# Patient Record
Sex: FEMALE | Race: WHITE | NOT HISPANIC OR LATINO
[De-identification: names, ages, dates, MRNs, and addresses within clinical notes are randomized per-mention and may not be internally consistent; named-entity substitution may affect disease eponyms.]

---

## 2017-03-06 ENCOUNTER — APPOINTMENT (OUTPATIENT)
Dept: ULTRASOUND IMAGING | Facility: IMAGING CENTER | Age: 67
End: 2017-03-06

## 2017-03-06 ENCOUNTER — APPOINTMENT (OUTPATIENT)
Dept: MAMMOGRAPHY | Facility: IMAGING CENTER | Age: 67
End: 2017-03-06

## 2017-03-06 ENCOUNTER — OUTPATIENT (OUTPATIENT)
Dept: OUTPATIENT SERVICES | Facility: HOSPITAL | Age: 67
LOS: 1 days | End: 2017-03-06
Payer: MEDICARE

## 2017-03-06 DIAGNOSIS — Z00.8 ENCOUNTER FOR OTHER GENERAL EXAMINATION: ICD-10-CM

## 2017-03-06 PROCEDURE — 76641 ULTRASOUND BREAST COMPLETE: CPT

## 2017-03-06 PROCEDURE — 77063 BREAST TOMOSYNTHESIS BI: CPT

## 2017-03-06 PROCEDURE — 77067 SCR MAMMO BI INCL CAD: CPT

## 2017-03-08 ENCOUNTER — OUTPATIENT (OUTPATIENT)
Dept: OUTPATIENT SERVICES | Facility: HOSPITAL | Age: 67
LOS: 1 days | End: 2017-03-08
Payer: MEDICARE

## 2017-03-08 ENCOUNTER — APPOINTMENT (OUTPATIENT)
Dept: ULTRASOUND IMAGING | Facility: IMAGING CENTER | Age: 67
End: 2017-03-08

## 2017-03-08 DIAGNOSIS — Z00.8 ENCOUNTER FOR OTHER GENERAL EXAMINATION: ICD-10-CM

## 2017-03-08 PROCEDURE — 76642 ULTRASOUND BREAST LIMITED: CPT

## 2017-03-09 ENCOUNTER — APPOINTMENT (OUTPATIENT)
Dept: SURGICAL ONCOLOGY | Facility: CLINIC | Age: 67
End: 2017-03-09

## 2017-03-09 VITALS
HEART RATE: 88 BPM | WEIGHT: 125 LBS | TEMPERATURE: 97.6 F | HEIGHT: 64 IN | BODY MASS INDEX: 21.34 KG/M2 | DIASTOLIC BLOOD PRESSURE: 83 MMHG | OXYGEN SATURATION: 92 % | RESPIRATION RATE: 16 BRPM | SYSTOLIC BLOOD PRESSURE: 146 MMHG

## 2017-05-24 ENCOUNTER — RESULT REVIEW (OUTPATIENT)
Age: 67
End: 2017-05-24

## 2018-03-09 ENCOUNTER — APPOINTMENT (OUTPATIENT)
Dept: MAMMOGRAPHY | Facility: IMAGING CENTER | Age: 68
End: 2018-03-09
Payer: MEDICARE

## 2018-03-09 ENCOUNTER — OUTPATIENT (OUTPATIENT)
Dept: OUTPATIENT SERVICES | Facility: HOSPITAL | Age: 68
LOS: 1 days | End: 2018-03-09
Payer: MEDICARE

## 2018-03-09 ENCOUNTER — APPOINTMENT (OUTPATIENT)
Dept: ULTRASOUND IMAGING | Facility: IMAGING CENTER | Age: 68
End: 2018-03-09
Payer: MEDICARE

## 2018-03-09 DIAGNOSIS — N60.19 DIFFUSE CYSTIC MASTOPATHY OF UNSPECIFIED BREAST: ICD-10-CM

## 2018-03-09 PROCEDURE — 76641 ULTRASOUND BREAST COMPLETE: CPT

## 2018-03-09 PROCEDURE — 76641 ULTRASOUND BREAST COMPLETE: CPT | Mod: 26,50

## 2018-03-09 PROCEDURE — 77063 BREAST TOMOSYNTHESIS BI: CPT | Mod: 26

## 2018-03-09 PROCEDURE — 77067 SCR MAMMO BI INCL CAD: CPT

## 2018-03-09 PROCEDURE — 77067 SCR MAMMO BI INCL CAD: CPT | Mod: 26

## 2018-03-09 PROCEDURE — 77063 BREAST TOMOSYNTHESIS BI: CPT

## 2018-04-16 ENCOUNTER — APPOINTMENT (OUTPATIENT)
Dept: SURGICAL ONCOLOGY | Facility: CLINIC | Age: 68
End: 2018-04-16

## 2019-03-10 ENCOUNTER — FORM ENCOUNTER (OUTPATIENT)
Age: 69
End: 2019-03-10

## 2019-03-11 ENCOUNTER — APPOINTMENT (OUTPATIENT)
Dept: MAMMOGRAPHY | Facility: IMAGING CENTER | Age: 69
End: 2019-03-11
Payer: MEDICARE

## 2019-03-11 ENCOUNTER — OUTPATIENT (OUTPATIENT)
Dept: OUTPATIENT SERVICES | Facility: HOSPITAL | Age: 69
LOS: 1 days | End: 2019-03-11
Payer: MEDICARE

## 2019-03-11 ENCOUNTER — APPOINTMENT (OUTPATIENT)
Dept: ULTRASOUND IMAGING | Facility: IMAGING CENTER | Age: 69
End: 2019-03-11
Payer: MEDICARE

## 2019-03-11 DIAGNOSIS — N60.19 DIFFUSE CYSTIC MASTOPATHY OF UNSPECIFIED BREAST: ICD-10-CM

## 2019-03-11 PROCEDURE — 77067 SCR MAMMO BI INCL CAD: CPT

## 2019-03-11 PROCEDURE — 77063 BREAST TOMOSYNTHESIS BI: CPT | Mod: 26

## 2019-03-11 PROCEDURE — 76641 ULTRASOUND BREAST COMPLETE: CPT

## 2019-03-11 PROCEDURE — 76641 ULTRASOUND BREAST COMPLETE: CPT | Mod: 26,50

## 2019-03-11 PROCEDURE — 77063 BREAST TOMOSYNTHESIS BI: CPT

## 2019-03-11 PROCEDURE — 77067 SCR MAMMO BI INCL CAD: CPT | Mod: 26

## 2019-03-14 ENCOUNTER — APPOINTMENT (OUTPATIENT)
Dept: SURGICAL ONCOLOGY | Facility: CLINIC | Age: 69
End: 2019-03-14
Payer: MEDICARE

## 2019-03-14 VITALS
SYSTOLIC BLOOD PRESSURE: 137 MMHG | HEIGHT: 64 IN | DIASTOLIC BLOOD PRESSURE: 85 MMHG | BODY MASS INDEX: 23.14 KG/M2 | HEART RATE: 70 BPM | TEMPERATURE: 98.1 F | OXYGEN SATURATION: 97 % | WEIGHT: 135.56 LBS

## 2019-03-14 DIAGNOSIS — N60.19 DIFFUSE CYSTIC MASTOPATHY OF UNSPECIFIED BREAST: ICD-10-CM

## 2019-03-14 PROCEDURE — 99214 OFFICE O/P EST MOD 30 MIN: CPT

## 2019-03-14 NOTE — PHYSICAL EXAM
[Normal] : normal breast inspection and palpation of axillas [Normal Supraclavicular Lymph Nodes] : normal supraclavicular lymph nodes [Normal Axillary Lymph Nodes] : normal axillary lymph nodes [Normal] : oriented to person, place and time, with appropriate affect [FreeTextEntry1] : AB present during exam  [de-identified] : No dominant mass or nipple discharge bilaterally.

## 2019-03-14 NOTE — HISTORY OF PRESENT ILLNESS
[de-identified] : Allison is a 68 year old post menopausal female here for an annual breast exam.  She has not been seen since March 2017.  Her history is significant for benign left breast biopsy in 2009 and history of benign cyst aspiration.  She denies personal or family history of breast or ovarian cancer.  Her brother succumbed to pancreatic testing.  Given her brothers history, she underwent a comprehensive genetic testing panel with a physician at [facility] and states she did not harbor any mutations. \par \par Most recent imaging consists of a mammogram and sonogram performed in March 2019 which demonstrated benign findings (BIRADS 2).  \par \par Today she is without any complaints. Denies palpable masses, nipple discharge, skin changes, inversion or breast pain.

## 2019-03-14 NOTE — CONSULT LETTER
[Dear  ___] : Dear  [unfilled], [Consult Letter:] : I had the pleasure of evaluating your patient, [unfilled]. [Please see my note below.] : Please see my note below. [Consult Closing:] : Thank you very much for allowing me to participate in the care of this patient.  If you have any questions, please do not hesitate to contact me. [Sincerely,] : Sincerely, [DrVenancio  ___] : Dr. PROCTOR [FreeTextEntry2] : Scott Pedro MD [FreeTextEntry1] : Allison is a 68 year old post menopausal female here for an annual breast exam.  She has not been seen since March 2017.  Her history is significant for benign left breast biopsy in 2009 and history of benign cyst aspiration.  She denies personal or family history of breast or ovarian cancer.  Her brother succumbed to pancreatic testing.  Given her brothers history, she underwent a comprehensive genetic testing panel with a physician at [facility] and states she did not harbor any mutations. \par \par Most recent imaging consists of a mammogram and sonogram performed in March 2019 which demonstrated benign findings (BIRADS 2).  \par \par Today she is without any complaints. Denies palpable masses, nipple discharge, skin changes, inversion or breast pain. \par \par On exam, both breasts are without any dominant masses. There is no axillary adenopathy on either side.\par \par I have asked Allison to follow up in a year upon completion of annual bilateral mammogram and sonogram. [FreeTextEntry3] : Samuel Henry MD\par Surgical Oncology\par [facility]\par Office: [phone]\par Cell: [phone]

## 2020-03-13 ENCOUNTER — APPOINTMENT (OUTPATIENT)
Dept: MAMMOGRAPHY | Facility: IMAGING CENTER | Age: 70
End: 2020-03-13

## 2020-03-13 ENCOUNTER — APPOINTMENT (OUTPATIENT)
Dept: ULTRASOUND IMAGING | Facility: IMAGING CENTER | Age: 70
End: 2020-03-13

## 2020-03-26 ENCOUNTER — APPOINTMENT (OUTPATIENT)
Dept: SURGICAL ONCOLOGY | Facility: CLINIC | Age: 70
End: 2020-03-26

## 2020-07-21 ENCOUNTER — OUTPATIENT (OUTPATIENT)
Dept: OUTPATIENT SERVICES | Facility: HOSPITAL | Age: 70
LOS: 1 days | End: 2020-07-21
Payer: MEDICARE

## 2020-07-21 ENCOUNTER — APPOINTMENT (OUTPATIENT)
Dept: MAMMOGRAPHY | Facility: IMAGING CENTER | Age: 70
End: 2020-07-21
Payer: MEDICARE

## 2020-07-21 ENCOUNTER — APPOINTMENT (OUTPATIENT)
Dept: ULTRASOUND IMAGING | Facility: IMAGING CENTER | Age: 70
End: 2020-07-21
Payer: MEDICARE

## 2020-07-21 DIAGNOSIS — Z00.8 ENCOUNTER FOR OTHER GENERAL EXAMINATION: ICD-10-CM

## 2020-07-21 PROCEDURE — 76641 ULTRASOUND BREAST COMPLETE: CPT

## 2020-07-21 PROCEDURE — 77063 BREAST TOMOSYNTHESIS BI: CPT | Mod: 26

## 2020-07-21 PROCEDURE — 77067 SCR MAMMO BI INCL CAD: CPT | Mod: 26

## 2020-07-21 PROCEDURE — 77063 BREAST TOMOSYNTHESIS BI: CPT

## 2020-07-21 PROCEDURE — 76641 ULTRASOUND BREAST COMPLETE: CPT | Mod: 26,50

## 2020-07-21 PROCEDURE — 77067 SCR MAMMO BI INCL CAD: CPT

## 2021-07-22 ENCOUNTER — APPOINTMENT (OUTPATIENT)
Dept: ULTRASOUND IMAGING | Facility: IMAGING CENTER | Age: 71
End: 2021-07-22
Payer: MEDICARE

## 2021-07-22 ENCOUNTER — OUTPATIENT (OUTPATIENT)
Dept: OUTPATIENT SERVICES | Facility: HOSPITAL | Age: 71
LOS: 1 days | End: 2021-07-22
Payer: MEDICARE

## 2021-07-22 ENCOUNTER — APPOINTMENT (OUTPATIENT)
Dept: MAMMOGRAPHY | Facility: IMAGING CENTER | Age: 71
End: 2021-07-22
Payer: MEDICARE

## 2021-07-22 DIAGNOSIS — Z00.8 ENCOUNTER FOR OTHER GENERAL EXAMINATION: ICD-10-CM

## 2021-07-22 PROCEDURE — 76641 ULTRASOUND BREAST COMPLETE: CPT | Mod: 26,50

## 2021-07-22 PROCEDURE — 77063 BREAST TOMOSYNTHESIS BI: CPT | Mod: 26

## 2021-07-22 PROCEDURE — 77063 BREAST TOMOSYNTHESIS BI: CPT

## 2021-07-22 PROCEDURE — 77067 SCR MAMMO BI INCL CAD: CPT | Mod: 26

## 2021-07-22 PROCEDURE — 76641 ULTRASOUND BREAST COMPLETE: CPT

## 2021-07-22 PROCEDURE — 77067 SCR MAMMO BI INCL CAD: CPT

## 2022-03-03 ENCOUNTER — OUTPATIENT (OUTPATIENT)
Dept: OUTPATIENT SERVICES | Facility: HOSPITAL | Age: 72
LOS: 1 days | End: 2022-03-03
Payer: MEDICARE

## 2022-03-03 ENCOUNTER — APPOINTMENT (OUTPATIENT)
Dept: ULTRASOUND IMAGING | Facility: IMAGING CENTER | Age: 72
End: 2022-03-03
Payer: MEDICARE

## 2022-03-03 ENCOUNTER — APPOINTMENT (OUTPATIENT)
Dept: MAMMOGRAPHY | Facility: IMAGING CENTER | Age: 72
End: 2022-03-03
Payer: MEDICARE

## 2022-03-03 DIAGNOSIS — N60.12 DIFFUSE CYSTIC MASTOPATHY OF LEFT BREAST: ICD-10-CM

## 2022-03-03 DIAGNOSIS — N60.11 DIFFUSE CYSTIC MASTOPATHY OF RIGHT BREAST: ICD-10-CM

## 2022-03-03 PROCEDURE — 76641 ULTRASOUND BREAST COMPLETE: CPT

## 2022-03-03 PROCEDURE — 76642 ULTRASOUND BREAST LIMITED: CPT | Mod: 26,RT

## 2022-03-03 PROCEDURE — 76642 ULTRASOUND BREAST LIMITED: CPT

## 2022-05-06 ENCOUNTER — EMERGENCY (EMERGENCY)
Facility: HOSPITAL | Age: 72
LOS: 1 days | Discharge: HOME | End: 2022-05-06
Attending: EMERGENCY MEDICINE
Payer: MEDICARE

## 2022-05-06 ENCOUNTER — APPOINTMENT (OUTPATIENT)
Dept: UROLOGY | Facility: CLINIC | Age: 72
End: 2022-05-06
Payer: MEDICARE

## 2022-05-06 VITALS
SYSTOLIC BLOOD PRESSURE: 136 MMHG | HEIGHT: 59 IN | RESPIRATION RATE: 18 BRPM | OXYGEN SATURATION: 97 % | DIASTOLIC BLOOD PRESSURE: 73 MMHG | HEART RATE: 73 BPM | WEIGHT: 125 LBS | TEMPERATURE: 98 F

## 2022-05-06 VITALS
WEIGHT: 125 LBS | RESPIRATION RATE: 14 BRPM | BODY MASS INDEX: 25.2 KG/M2 | HEIGHT: 59 IN | HEART RATE: 76 BPM | OXYGEN SATURATION: 96 % | DIASTOLIC BLOOD PRESSURE: 78 MMHG | TEMPERATURE: 97.8 F | SYSTOLIC BLOOD PRESSURE: 120 MMHG

## 2022-05-06 DIAGNOSIS — N20.0 CALCULUS OF KIDNEY: ICD-10-CM

## 2022-05-06 LAB
ALBUMIN SERPL ELPH-MCNC: 4.6 G/DL — SIGNIFICANT CHANGE UP (ref 3.3–5)
ALP SERPL-CCNC: 76 U/L — SIGNIFICANT CHANGE UP (ref 40–120)
ALT FLD-CCNC: 18 U/L — SIGNIFICANT CHANGE UP (ref 10–45)
ANION GAP SERPL CALC-SCNC: 15 MMOL/L — SIGNIFICANT CHANGE UP (ref 5–17)
APPEARANCE UR: CLEAR — SIGNIFICANT CHANGE UP
AST SERPL-CCNC: 24 U/L — SIGNIFICANT CHANGE UP (ref 10–40)
BACTERIA # UR AUTO: NEGATIVE — SIGNIFICANT CHANGE UP
BASOPHILS # BLD AUTO: 0.03 K/UL — SIGNIFICANT CHANGE UP (ref 0–0.2)
BASOPHILS NFR BLD AUTO: 0.3 % — SIGNIFICANT CHANGE UP (ref 0–2)
BILIRUB SERPL-MCNC: 0.9 MG/DL — SIGNIFICANT CHANGE UP (ref 0.2–1.2)
BILIRUB UR-MCNC: NEGATIVE — SIGNIFICANT CHANGE UP
BUN SERPL-MCNC: 13 MG/DL — SIGNIFICANT CHANGE UP (ref 7–23)
CALCIUM SERPL-MCNC: 10.3 MG/DL — SIGNIFICANT CHANGE UP (ref 8.4–10.5)
CHLORIDE SERPL-SCNC: 99 MMOL/L — SIGNIFICANT CHANGE UP (ref 96–108)
CO2 SERPL-SCNC: 22 MMOL/L — SIGNIFICANT CHANGE UP (ref 22–31)
COLOR SPEC: COLORLESS — SIGNIFICANT CHANGE UP
CREAT SERPL-MCNC: 0.78 MG/DL — SIGNIFICANT CHANGE UP (ref 0.5–1.3)
DIFF PNL FLD: NEGATIVE — SIGNIFICANT CHANGE UP
EGFR: 81 ML/MIN/1.73M2 — SIGNIFICANT CHANGE UP
EOSINOPHIL # BLD AUTO: 0.03 K/UL — SIGNIFICANT CHANGE UP (ref 0–0.5)
EOSINOPHIL NFR BLD AUTO: 0.3 % — SIGNIFICANT CHANGE UP (ref 0–6)
EPI CELLS # UR: 0 /HPF — SIGNIFICANT CHANGE UP
FLUAV AG NPH QL: SIGNIFICANT CHANGE UP
FLUBV AG NPH QL: SIGNIFICANT CHANGE UP
GLUCOSE SERPL-MCNC: 114 MG/DL — HIGH (ref 70–99)
GLUCOSE UR QL: NEGATIVE — SIGNIFICANT CHANGE UP
HCT VFR BLD CALC: 47.3 % — HIGH (ref 34.5–45)
HGB BLD-MCNC: 15.6 G/DL — HIGH (ref 11.5–15.5)
IMM GRANULOCYTES NFR BLD AUTO: 0.7 % — SIGNIFICANT CHANGE UP (ref 0–1.5)
KETONES UR-MCNC: NEGATIVE — SIGNIFICANT CHANGE UP
LEUKOCYTE ESTERASE UR-ACNC: NEGATIVE — SIGNIFICANT CHANGE UP
LYMPHOCYTES # BLD AUTO: 1.19 K/UL — SIGNIFICANT CHANGE UP (ref 1–3.3)
LYMPHOCYTES # BLD AUTO: 10.5 % — LOW (ref 13–44)
MAGNESIUM SERPL-MCNC: 2 MG/DL — SIGNIFICANT CHANGE UP (ref 1.6–2.6)
MCHC RBC-ENTMCNC: 29.5 PG — SIGNIFICANT CHANGE UP (ref 27–34)
MCHC RBC-ENTMCNC: 33 GM/DL — SIGNIFICANT CHANGE UP (ref 32–36)
MCV RBC AUTO: 89.4 FL — SIGNIFICANT CHANGE UP (ref 80–100)
MONOCYTES # BLD AUTO: 1.1 K/UL — HIGH (ref 0–0.9)
MONOCYTES NFR BLD AUTO: 9.7 % — SIGNIFICANT CHANGE UP (ref 2–14)
NEUTROPHILS # BLD AUTO: 8.88 K/UL — HIGH (ref 1.8–7.4)
NEUTROPHILS NFR BLD AUTO: 78.5 % — HIGH (ref 43–77)
NITRITE UR-MCNC: NEGATIVE — SIGNIFICANT CHANGE UP
NRBC # BLD: 0 /100 WBCS — SIGNIFICANT CHANGE UP (ref 0–0)
PH UR: 6.5 — SIGNIFICANT CHANGE UP (ref 5–8)
PHOSPHATE SERPL-MCNC: 3.4 MG/DL — SIGNIFICANT CHANGE UP (ref 2.5–4.5)
PLATELET # BLD AUTO: 235 K/UL — SIGNIFICANT CHANGE UP (ref 150–400)
POTASSIUM SERPL-MCNC: 3.6 MMOL/L — SIGNIFICANT CHANGE UP (ref 3.5–5.3)
POTASSIUM SERPL-SCNC: 3.6 MMOL/L — SIGNIFICANT CHANGE UP (ref 3.5–5.3)
PROT SERPL-MCNC: 7.5 G/DL — SIGNIFICANT CHANGE UP (ref 6–8.3)
PROT UR-MCNC: NEGATIVE — SIGNIFICANT CHANGE UP
RBC # BLD: 5.29 M/UL — HIGH (ref 3.8–5.2)
RBC # FLD: 13.7 % — SIGNIFICANT CHANGE UP (ref 10.3–14.5)
RBC CASTS # UR COMP ASSIST: 0 /HPF — SIGNIFICANT CHANGE UP (ref 0–4)
RSV RNA NPH QL NAA+NON-PROBE: SIGNIFICANT CHANGE UP
SARS-COV-2 RNA SPEC QL NAA+PROBE: SIGNIFICANT CHANGE UP
SODIUM SERPL-SCNC: 136 MMOL/L — SIGNIFICANT CHANGE UP (ref 135–145)
SP GR SPEC: 1 — LOW (ref 1.01–1.02)
TROPONIN T, HIGH SENSITIVITY RESULT: <6 NG/L — SIGNIFICANT CHANGE UP (ref 0–51)
UROBILINOGEN FLD QL: NEGATIVE — SIGNIFICANT CHANGE UP
WBC # BLD: 11.31 K/UL — HIGH (ref 3.8–10.5)
WBC # FLD AUTO: 11.31 K/UL — HIGH (ref 3.8–10.5)
WBC UR QL: 1 /HPF — SIGNIFICANT CHANGE UP (ref 0–5)

## 2022-05-06 PROCEDURE — 74176 CT ABD & PELVIS W/O CONTRAST: CPT | Mod: 26,MA

## 2022-05-06 PROCEDURE — 93010 ELECTROCARDIOGRAM REPORT: CPT | Mod: GC

## 2022-05-06 PROCEDURE — 74176 CT ABD & PELVIS W/O CONTRAST: CPT | Mod: MA

## 2022-05-06 PROCEDURE — 80053 COMPREHEN METABOLIC PANEL: CPT

## 2022-05-06 PROCEDURE — 99204 OFFICE O/P NEW MOD 45 MIN: CPT

## 2022-05-06 PROCEDURE — 85025 COMPLETE CBC W/AUTO DIFF WBC: CPT

## 2022-05-06 PROCEDURE — 83735 ASSAY OF MAGNESIUM: CPT

## 2022-05-06 PROCEDURE — 81001 URINALYSIS AUTO W/SCOPE: CPT

## 2022-05-06 PROCEDURE — 87086 URINE CULTURE/COLONY COUNT: CPT

## 2022-05-06 PROCEDURE — 84484 ASSAY OF TROPONIN QUANT: CPT

## 2022-05-06 PROCEDURE — 99285 EMERGENCY DEPT VISIT HI MDM: CPT | Mod: 25,GC

## 2022-05-06 PROCEDURE — 93005 ELECTROCARDIOGRAM TRACING: CPT

## 2022-05-06 PROCEDURE — 84100 ASSAY OF PHOSPHORUS: CPT

## 2022-05-06 PROCEDURE — 87637 SARSCOV2&INF A&B&RSV AMP PRB: CPT

## 2022-05-06 PROCEDURE — 99285 EMERGENCY DEPT VISIT HI MDM: CPT | Mod: 25

## 2022-05-06 RX ORDER — KETOROLAC TROMETHAMINE 30 MG/ML
15 SYRINGE (ML) INJECTION ONCE
Refills: 0 | Status: DISCONTINUED | OUTPATIENT
Start: 2022-05-06 | End: 2022-05-06

## 2022-05-06 RX ORDER — ACETAMINOPHEN 500 MG
975 TABLET ORAL ONCE
Refills: 0 | Status: DISCONTINUED | OUTPATIENT
Start: 2022-05-06 | End: 2022-05-06

## 2022-05-06 RX ORDER — SODIUM CHLORIDE 9 MG/ML
1000 INJECTION INTRAMUSCULAR; INTRAVENOUS; SUBCUTANEOUS ONCE
Refills: 0 | Status: COMPLETED | OUTPATIENT
Start: 2022-05-06 | End: 2022-05-06

## 2022-05-06 RX ORDER — ACETAMINOPHEN 500 MG
1000 TABLET ORAL ONCE
Refills: 0 | Status: COMPLETED | OUTPATIENT
Start: 2022-05-06 | End: 2022-05-06

## 2022-05-06 RX ADMIN — SODIUM CHLORIDE 1000 MILLILITER(S): 9 INJECTION INTRAMUSCULAR; INTRAVENOUS; SUBCUTANEOUS at 22:27

## 2022-05-06 RX ADMIN — Medication 15 MILLIGRAM(S): at 22:28

## 2022-05-06 RX ADMIN — Medication 400 MILLIGRAM(S): at 22:27

## 2022-05-06 NOTE — ED PROVIDER NOTE - PROGRESS NOTE DETAILS
Lizette, PGY-3: likely msk pain causing vagal episodes, discussed case with neurologist who agrees, her recent MRI and carotid u/s were negative, CT here non-actionable no nephrolithiasis (renal stone seen), UA negative. PT feels much improved requesting discharge. strict return precautions given, f/u outpatient, ambulatory without difficulty.
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
## 2022-05-06 NOTE — ED PROVIDER NOTE - PHYSICAL EXAMINATION
[Const] well-appearing, resting comfortably, no acute distress  [HEENT] PERRL, EOMI, moist mucus membranes  [Neck] Supple, trachea midline  [CV] +S1/S2, no m/r/g appreciated  [Lungs] Clear to auscultations bilaterally, no adventitious lung sounds  [Abd] soft, non-tender, nondistended in all 4 quadrants  [MSK] 5/5 upper extremity and lower extremity str bilaterally  [Skin] warm, dry, well-perfused  [Neuro] A&Ox3, gait intact  [] rt cvat

## 2022-05-06 NOTE — ED PROVIDER NOTE - PATIENT PORTAL LINK FT
You can access the FollowMyHealth Patient Portal offered by [facility] by registering at the following website: [email or web address]. By joining [employer]’s FollowMyHealth portal, you will also be able to view your health information using other applications (apps) compatible with our system.

## 2022-05-06 NOTE — ED ADULT NURSE NOTE - NSIMPLEMENTINTERV_GEN_ALL_ED
Implemented All Universal Safety Interventions:  [place] to call system. Call bell, personal items and telephone within reach. Instruct patient to call for assistance. Room bathroom lighting operational. Non-slip footwear when patient is off stretcher. Physically safe environment: no spills, clutter or unnecessary equipment. Stretcher in lowest position, wheels locked, appropriate side rails in place. Patent

## 2022-05-06 NOTE — ED PROVIDER NOTE - ATTENDING CONTRIBUTION TO CARE
71F with PMHx/PSHx including renal calculus, s/p cholecystectomy, hemangioma,  presents to the ED with two episodes of syncope within the last 24 hrs with r flank pain radiation to the side, with no uti sts, no n/v/f.  pt had prior kidney stone with dr weber 10 yrs ago. pt has been seeing dr humeivic for syncope we spoke with him carotid doppler, mri nl likely vasovagal from pain, dehydration.

## 2022-05-06 NOTE — ED PROVIDER NOTE - RAPID ASSESSMENT
71F with PMHx/PSHx including renal calculus, s/p cholecystectomy presents to the ED with two episodes of syncope within the last 24 hours. Patient first passed out last night at 2130. Endorses LOC, but is unsure if there was head trauma. Patient has an MRI done today, Dr. Yepez (neurology) preformed the MRI. Reports another episode of syncope after performing the MRI. Endorses severe flank pain starting yesterday before the episode of syncope. Reports that the pain feels as if a "baseball bat hit her side." Denies bruising. Reports soft stool yesterday and today. Endorses nausea. Denies any episode of emesis.     Srini RICH (Scribe) have documented this rapid assessment note under the dictation of Clarissa Lopez (PA) which has been reviewed and affirmed to be accurate. Patient was seen as a QPA patient. The patient will be seen and further worked up in the main emergency department and their care will be completed by the main emergency department team along with a thorough physical exam. Receiving team will follow up on labs, analgesia, any clinical imaging, reassess and disposition as clinically indicated, all decisions regarding the progression of care will be made at their discretion. 71F with PMHx/PSHx including renal calculus, s/p cholecystectomy, hemangioma,  presents to the ED with two episodes of syncope within the last 24 hours. Patient first passed out last night at 2130. Endorses LOC, but is unsure if there was head trauma. Patient has an MRI done today, Dr. Yepez (neurology) referred for MRI for f/u of previous surgery (?). Reports another episode of syncope after performing the MRI, caught by technician. Also endorses severe R flank pain starting yesterday before the episode of syncope. Reports that the pain feels as if a "baseball bat hit her side." Denies bruising. Reports soft stool yesterday and today. Endorses nausea. Denies any episode of emesis. Denies fever/chills, urinary symptoms, CP, SOB.     Srini RICH (Scribchong) have documented this rapid assessment note under the dictation of Clarissa Lopez (PA) which has been reviewed and affirmed to be accurate. Patient was seen as a QPA patient. The patient will be seen and further worked up in the main emergency department and their care will be completed by the main emergency department team along with a thorough physical exam. Receiving team will follow up on labs, analgesia, any clinical imaging, reassess and disposition as clinically indicated, all decisions regarding the progression of care will be made at their discretion.      Clarissa RICH PA-C, personally performed the service described in the documentation recorded by the scribe in my presence, and it accurately and completely records my words and actions.

## 2022-05-06 NOTE — ED ADULT TRIAGE NOTE - CHIEF COMPLAINT QUOTE
slipped and fell last night, hit head and reportedly +LOC  went for mri brain done today normal, syncope while getting off mri table

## 2022-05-06 NOTE — ED ADULT NURSE NOTE - NSICDXPASTSURGICALHX_GEN_ALL_CORE_FT
PAST SURGICAL HISTORY:  History of cholecystectomy     History of vein stripping     Hx of tonsillectomy     Thyroid nodule s/p excision (L) side

## 2022-05-06 NOTE — ED ADULT NURSE NOTE - OBJECTIVE STATEMENT
70 y/o F A&Ox3 PMH liver hemangioma, L breast mass, renal calculus PSH cholecystectomy presents to the ED from home c/o right flank pain. Pt endorses intermittent right flank pain x2 days. 70 y/o F A&Ox3 PMH liver hemangioma, L breast mass, renal calculus PSH cholecystectomy presents to the ED from home c/o right flank pain. Pt endorses intermittent, nonradiating right flank pain x2 days. Pt reports 1 episode of vomiting yesterday. Pt also endorses two syncopal episodes over the past two days. Pt reports falling to the floor and hitting her head, pt states she had scheduled MRI preformed yesterday morning. Upon arrival to ED pt is well appearing. Breathing is even and unlabored. Skin is warm, dry & in tact. Denies CP, SOB, HA, numbness, tingling, urinary s/s, fevers, chills. IV access obtained by QRN. Comfort & safety provided.

## 2022-05-06 NOTE — ED PROVIDER NOTE - CLINICAL SUMMARY MEDICAL DECISION MAKING FREE TEXT BOX
72 y/o F w/ hx nephrolithiasis presents with right flank pain and syncope x2. Outpatient uro requested CT non-con renal (in [facility]). Obtain labs, UA, CT, pain meds and supportive care.

## 2022-05-06 NOTE — ED PROVIDER NOTE - OBJECTIVE STATEMENT
70 y/o F w/ hx nephrolithiasis, hemangioma presents with 2 episodes of sycope within the last 24 hours. Patient passed out last night at 21:30, has also been having severe right flank pain that feels like her kidney stones in the past. Had an outpatient routine MRI that was happened to be scheduled today. Discussed case with outpatient neurologist Cris who believes she is having vasovagal events, possibly associated with pain. States her MRI only showed hemangioma, no acute findings, and carotid ultrasound was negative. Patient also saw Dr. eHrnandez (urology office) who wanted her to get CT abd non-con for stone hunt. denies hematuria urgency frequency.

## 2022-05-07 VITALS
OXYGEN SATURATION: 100 % | RESPIRATION RATE: 18 BRPM | TEMPERATURE: 98 F | SYSTOLIC BLOOD PRESSURE: 146 MMHG | HEART RATE: 68 BPM | DIASTOLIC BLOOD PRESSURE: 72 MMHG

## 2022-05-07 LAB
APPEARANCE: CLEAR
BACTERIA: NEGATIVE
BILIRUBIN URINE: NEGATIVE
BLOOD URINE: NEGATIVE
COLOR: YELLOW
CULTURE RESULTS: SIGNIFICANT CHANGE UP
GLUCOSE QUALITATIVE U: NEGATIVE
HYALINE CASTS: 0 /LPF
KETONES URINE: NEGATIVE
LEUKOCYTE ESTERASE URINE: NEGATIVE
MICROSCOPIC-UA: NORMAL
NITRITE URINE: NEGATIVE
PH URINE: 6.5
PROTEIN URINE: NEGATIVE
RED BLOOD CELLS URINE: 1 /HPF
SPECIFIC GRAVITY URINE: 1.01
SPECIMEN SOURCE: SIGNIFICANT CHANGE UP
SQUAMOUS EPITHELIAL CELLS: 1 /HPF
UROBILINOGEN URINE: NORMAL
WHITE BLOOD CELLS URINE: 1 /HPF

## 2022-05-08 LAB — BACTERIA UR CULT: NORMAL

## 2022-05-09 ENCOUNTER — NON-APPOINTMENT (OUTPATIENT)
Age: 72
End: 2022-05-09

## 2022-05-09 NOTE — LETTER BODY
[Dear  ___] : Dear  [unfilled], [Consult Letter:] : I had the pleasure of evaluating your patient, [unfilled]. [Please see my note below.] : Please see my note below. [Consult Closing:] : Thank you very much for allowing me to participate in the care of this patient.  If you have any questions, please do not hesitate to contact me. [Sincerely,] : Sincerely, [FreeTextEntry2] : Dr Matthew Ortiz\par [address]\par [place], [place]\par [address] [FreeTextEntry3] : Torey Rain MD\par [facility]\par [address] [place] [address], [address]\par [place], [place] [address]\par p: [phone]\par f: [phone]

## 2022-05-09 NOTE — HISTORY OF PRESENT ILLNESS
[FreeTextEntry1] : YANCY LANDERS is a 71 year F who presents today as a new patient evaluation for flank pain.\par \par Yesterday, she had a loose BM and felt unwell. Her whole back hurt. She then developed severe right flank pain and passed out. When she woke up she was nauseated. She had chills with the pain without fever. No dysuria, gross hematuria. Feels better now. Has a history of stones and had a URS with Dr. Hernandez in 2013. She thinks this is similar.

## 2022-05-09 NOTE — PHYSICAL EXAM
[Normal Appearance] : normal appearance [Edema] : no peripheral edema [Exaggerated Use Of Accessory Muscles For Inspiration] : no accessory muscle use [Lungs Percussion] : the lungs were normal to percussion [Abdomen Tenderness] : non-tender [Normal Station and Gait] : the gait and station were normal for the patient's age [] : no rash [Sensation] : the sensory exam was normal to light touch and pinprick [Affect] : the affect was normal [No Palpable Adenopathy] : no palpable adenopathy [FreeTextEntry1] : +right CVA tenderness

## 2022-05-09 NOTE — ASSESSMENT
[FreeTextEntry1] : Ms Cuellar is a 71 y.o. F who presents with acute right flank pain. She did pass out last night. She is scheduled to see her neurologist this afternoon and have an MRI performed (for follow-up of a brain lesion)\par - UA, UCx\par - CT renal stone protocol\par - Discussed flomax for MET. She tried this in the past and this caused hypotension therefore will avoid\par - Tylenol / motrin as needed\par - Discussed if pain is poorly controlled, if she experiences fever / chills, intractable nausea / emesis - she should present to the ED. All questions answered\par \par addendum 5/9\regino was seen in ED 5/7 - CT renal stone with LEFT non obstructing stones, no right stones. she has a bruise on her right side where she fell. better with ice / heating pad. suspect msk pain - she is in agreement. she underwent head CT and brain MRI with neurologist on friday

## 2022-08-17 ENCOUNTER — APPOINTMENT (OUTPATIENT)
Dept: ULTRASOUND IMAGING | Facility: IMAGING CENTER | Age: 72
End: 2022-08-17

## 2022-08-17 ENCOUNTER — OUTPATIENT (OUTPATIENT)
Dept: OUTPATIENT SERVICES | Facility: HOSPITAL | Age: 72
LOS: 1 days | End: 2022-08-17
Payer: MEDICARE

## 2022-08-17 ENCOUNTER — APPOINTMENT (OUTPATIENT)
Dept: MAMMOGRAPHY | Facility: IMAGING CENTER | Age: 72
End: 2022-08-17

## 2022-08-17 DIAGNOSIS — R92.2 INCONCLUSIVE MAMMOGRAM: ICD-10-CM

## 2022-08-17 DIAGNOSIS — Z12.31 ENCOUNTER FOR SCREENING MAMMOGRAM FOR MALIGNANT NEOPLASM OF BREAST: ICD-10-CM

## 2022-08-17 PROCEDURE — 77063 BREAST TOMOSYNTHESIS BI: CPT

## 2022-08-17 PROCEDURE — 76641 ULTRASOUND BREAST COMPLETE: CPT

## 2022-08-17 PROCEDURE — 76641 ULTRASOUND BREAST COMPLETE: CPT | Mod: 26,50

## 2022-08-17 PROCEDURE — 77063 BREAST TOMOSYNTHESIS BI: CPT | Mod: 26

## 2022-08-17 PROCEDURE — 77067 SCR MAMMO BI INCL CAD: CPT | Mod: 26

## 2022-08-17 PROCEDURE — 77067 SCR MAMMO BI INCL CAD: CPT

## 2022-08-18 ENCOUNTER — APPOINTMENT (OUTPATIENT)
Dept: ORTHOPEDIC SURGERY | Facility: CLINIC | Age: 72
End: 2022-08-18

## 2022-08-18 ENCOUNTER — RESULT CHARGE (OUTPATIENT)
Age: 72
End: 2022-08-18

## 2022-08-18 VITALS — WEIGHT: 125 LBS | BODY MASS INDEX: 25.2 KG/M2 | HEIGHT: 59 IN

## 2022-08-18 VITALS — WEIGHT: 125 LBS | HEIGHT: 59 IN | BODY MASS INDEX: 25.2 KG/M2

## 2022-08-18 DIAGNOSIS — S20.219A CONTUSION OF UNSPECIFIED FRONT WALL OF THORAX, INITIAL ENCOUNTER: ICD-10-CM

## 2022-08-18 DIAGNOSIS — S39.012A STRAIN OF MUSCLE, FASCIA AND TENDON OF LOWER BACK, INITIAL ENCOUNTER: ICD-10-CM

## 2022-08-18 PROCEDURE — 71100 X-RAY EXAM RIBS UNI 2 VIEWS: CPT

## 2022-08-18 PROCEDURE — 72100 X-RAY EXAM L-S SPINE 2/3 VWS: CPT

## 2022-08-18 PROCEDURE — 72070 X-RAY EXAM THORAC SPINE 2VWS: CPT

## 2022-08-18 PROCEDURE — 99204 OFFICE O/P NEW MOD 45 MIN: CPT

## 2022-08-18 RX ORDER — LIDOCAINE 5% 700 MG/1
5 PATCH TOPICAL
Qty: 1 | Refills: 0 | Status: ACTIVE | COMMUNITY
Start: 2022-08-18 | End: 1900-01-01

## 2022-08-18 NOTE — DISCUSSION/SUMMARY
[de-identified] : discussed T 11 and possible compression fracture. If symptoms persist to have mri thoracic spine. Lumbar and thoracic spine advised PT but wants to just exercise on own. some pain cream helps but wants no oral meds. MRI ribs and thoracic spine may be indicated depending on symptoms. all explained

## 2022-08-18 NOTE — PHYSICAL EXAM
[] : clonus not sustained at ankle [FreeTextEntry8] : pain posterior axillary line area of rib 7 and 8 on right. Lungs clear A& P [TWNoteComboBox7] : forward flexion 75 degrees [de-identified] : extension 10 degrees [de-identified] : left lateral bending 10 degrees [de-identified] : right lateral bending 10 degrees

## 2022-08-18 NOTE — IMAGING
[Scoliosis] : Scoliosis [Disc space narrowing] : Disc space narrowing [No bony abnormalities] : No bony abnormalities [FreeTextEntry1] : note  significant  severe scoliosis and disc space narrowing all levels. T 11 calcification and slight decrease in height and possible  compression fracture T11 [FreeTextEntry5] : noted marked lung markings , no definite fracture but cannot be ruled out completely and may need mri  ribs on righ
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
## 2022-11-16 ENCOUNTER — APPOINTMENT (OUTPATIENT)
Dept: ORTHOPEDIC SURGERY | Facility: CLINIC | Age: 72
End: 2022-11-16

## 2022-11-16 VITALS — HEIGHT: 59 IN | BODY MASS INDEX: 25.2 KG/M2 | WEIGHT: 125 LBS

## 2022-11-16 PROCEDURE — 99213 OFFICE O/P EST LOW 20 MIN: CPT

## 2022-11-16 NOTE — DISCUSSION/SUMMARY
[de-identified] : The patient was advised of the diagnosis. The natural history of the pathology was explained in full to the patient in layman's terms. All questions were answered. The risks and benefits of surgical and non-surgical treatment alternatives were explained in full to the patient.\par \par \par discussed PT, Trigger point injection and may need to consider epidural. new mris may be needed.  and to see DR Houston for spine problems.

## 2022-11-16 NOTE — PHYSICAL EXAM
[5___] : right extensor hallicus longus 5[unfilled]/5 [] : clonus not sustained at ankle [FreeTextEntry8] : pain thoracic spine  and pain lumbar spine [TWNoteComboBox7] : forward flexion 75 degrees [de-identified] : extension 10 degrees [de-identified] : left lateral bending 10 degrees [de-identified] : right lateral bending 10 degrees

## 2023-03-01 ENCOUNTER — APPOINTMENT (OUTPATIENT)
Dept: ORTHOPEDIC SURGERY | Facility: CLINIC | Age: 73
End: 2023-03-01
Payer: MEDICARE

## 2023-03-01 VITALS — WEIGHT: 125 LBS | BODY MASS INDEX: 25.2 KG/M2 | HEIGHT: 59 IN

## 2023-03-01 DIAGNOSIS — M41.9 SCOLIOSIS, UNSPECIFIED: ICD-10-CM

## 2023-03-01 PROCEDURE — 99215 OFFICE O/P EST HI 40 MIN: CPT

## 2023-03-01 RX ORDER — DICLOFENAC SODIUM 1% 10 MG/G
1 GEL TOPICAL DAILY
Qty: 1 | Refills: 3 | Status: ACTIVE | COMMUNITY
Start: 2023-03-01 | End: 1900-01-01

## 2023-03-01 RX ORDER — LIDOCAINE 40 MG/G
4 PATCH TOPICAL
Qty: 30 | Refills: 2 | Status: ACTIVE | COMMUNITY
Start: 2023-03-01 | End: 1900-01-01

## 2023-03-01 NOTE — HISTORY OF PRESENT ILLNESS
[Lower back] : lower back [Sudden] : sudden [Dull/Aching] : dull/aching [Localized] : localized [Rest] : rest [Massage] : massage [Physical therapy] : physical therapy [3] : 3 [Constant] : constant [de-identified] : Pt seen by non-spine partners in the past \par \par 5/12/20 Cervical MRI  - report noted in chart. \par Findings: There is straightening of the cervical lordosis, convexity of the lower cervical spine towards the right and upper\par thoracic spine towards the left. The visualized posterior fossa structures appear unremarkable. The cervical spinal cord\par appears intact. There is multilevel disc desiccation with loss of disc height and degenerative endplate changes along with\par anterolisthesis at C4-C5. There are degenerative changes in the visualized upper thoracic spine. There are no gross\par paravertebral soft tissue masses.\par C2-C3: There is a left paracentral protrusion, mild disc bulging and bilateral uncovertebral joint hypertrophy.\par C3-C4: There is mild diffuse disc bulging, bony ridging, uncovertebral joint hypertrophy, and left greater than right\par foraminal narrowing.\par C4-C5: There is anterolisthesis, disc bulging, bony ridging, and uncovertebral joint hypertrophy with broad-based\par posterior disc herniation encroaching upon the cord and right greater than left foraminal narrowing with encroachment\par upon right greater than left exiting nerve roots.\par C5-C6: There is diffuse disc bulging and bony ridging with moderate bilateral foraminal narrowing and uncovertebral\par joint hypertrophy left greater than right.\par C6-C7: There is diffuse disc bulging, bony ridging and uncovertebral joint hypertrophy with moderate bilateral foraminal\par narrowing left greater than right.\par Ind. review- Near ankylosis across disc spaces C3/4, C5/6;\par Gr 1 C4/5 with b/l NF narrowing;\par \par 5/14/20 [place] MRI- report noted in chart. \par Gr 1 L4/5 with R NF HNP and L4, L5 compression\par -----------------------------------------\par 3/1/23- Burning pain across the beltline. No clear pain, N/T down the BLE. No bb dysfunction. Has been in formal PT. Also neck pain radiating across the scapulae.  [] : no

## 2023-03-01 NOTE — IMAGING
[de-identified] : CSPINE\par Inspection: No rash or ecchymosis\par Palpation: No spasm in traps, rhomboids, paracervicals\par ROM: limited all planes\par Strength: 5/5 bilateral deltoid, biceps, triceps, wrist flexors, wrist extensors, , abductors\par Sensation: Sensation present to light touch bilateral C5-T1 distributions\par Reflexes: + Harris's bilaterally \par \par LSPINE\par Inspection: No rash or ecchymosis\par Palpation: + b/l SIJ TTP\par ROM: limited all planes\par Strength: 5/5 bilateral hip flexors, knee extensors, ankle dorsiflexors, EHL, ankle plantarflexors\par Sensation: Sensation present to light touch bilateral L2-S1 distributions\par Provocative maneuvers: Negative bilateral straight leg raise  [Facet arthropathy] : Facet arthropathy [Disc space narrowing] : Disc space narrowing [Scoliosis] : Scoliosis [Spondylolithesis] : Spondylolithesis

## 2023-03-20 ENCOUNTER — APPOINTMENT (OUTPATIENT)
Dept: ORTHOPEDIC SURGERY | Facility: CLINIC | Age: 73
End: 2023-03-20

## 2023-05-03 ENCOUNTER — APPOINTMENT (OUTPATIENT)
Dept: ORTHOPEDIC SURGERY | Facility: CLINIC | Age: 73
End: 2023-05-03
Payer: MEDICARE

## 2023-05-03 VITALS — WEIGHT: 125 LBS | BODY MASS INDEX: 25.2 KG/M2 | HEIGHT: 59 IN

## 2023-05-03 DIAGNOSIS — M51.34 OTHER INTERVERTEBRAL DISC DEGENERATION, THORACIC REGION: ICD-10-CM

## 2023-05-03 DIAGNOSIS — M51.36 OTHER INTERVERTEBRAL DISC DEGENERATION, LUMBAR REGION: ICD-10-CM

## 2023-05-03 DIAGNOSIS — M43.17 SPONDYLOLISTHESIS, LUMBOSACRAL REGION: ICD-10-CM

## 2023-05-03 PROCEDURE — 99213 OFFICE O/P EST LOW 20 MIN: CPT

## 2023-05-03 NOTE — HISTORY OF PRESENT ILLNESS
[Lower back] : lower back [Sudden] : sudden [4] : 4 [Dull/Aching] : dull/aching [Localized] : localized [Constant] : constant [Rest] : rest [Massage] : massage [Physical therapy] : physical therapy [de-identified] : \par 5/12/20 Cervical MRI  - report noted in chart. \par Findings: There is straightening of the cervical lordosis, convexity of the lower cervical spine towards the right and upper\par thoracic spine towards the left. The visualized posterior fossa structures appear unremarkable. The cervical spinal cord\par appears intact. There is multilevel disc desiccation with loss of disc height and degenerative endplate changes along with\par anterolisthesis at C4-C5. There are degenerative changes in the visualized upper thoracic spine. There are no gross\par paravertebral soft tissue masses.\par C2-C3: There is a left paracentral protrusion, mild disc bulging and bilateral uncovertebral joint hypertrophy.\par C3-C4: There is mild diffuse disc bulging, bony ridging, uncovertebral joint hypertrophy, and left greater than right\par foraminal narrowing.\par C4-C5: There is anterolisthesis, disc bulging, bony ridging, and uncovertebral joint hypertrophy with broad-based\par posterior disc herniation encroaching upon the cord and right greater than left foraminal narrowing with encroachment\par upon right greater than left exiting nerve roots.\par C5-C6: There is diffuse disc bulging and bony ridging with moderate bilateral foraminal narrowing and uncovertebral\par joint hypertrophy left greater than right.\par C6-C7: There is diffuse disc bulging, bony ridging and uncovertebral joint hypertrophy with moderate bilateral foraminal\par narrowing left greater than right.\par Ind. review- Near ankylosis across disc spaces C3/4, C5/6;\par Gr 1 C4/5 with b/l NF narrowing;\par \par 5/14/20 [place] MRI- report noted in chart. \par Gr 1 L4/5 with R NF HNP and L4, L5 compression\par -----------------------------------------\par 3/1/23- Burning pain across the beltline. No clear pain, N/T down the BLE. No bb dysfunction. Has been in formal PT. Also neck pain radiating across the scapulae. \par 5/3/23- Sxs same, still across the beltline, in PT [] : no

## 2023-05-03 NOTE — IMAGING
[Facet arthropathy] : Facet arthropathy [Disc space narrowing] : Disc space narrowing [Scoliosis] : Scoliosis [Spondylolithesis] : Spondylolithesis [de-identified] : CSPINE\par Inspection: No rash or ecchymosis\par Palpation: No spasm in traps, rhomboids, paracervicals\par ROM: limited all planes\par Strength: 5/5 bilateral deltoid, biceps, triceps, wrist flexors, wrist extensors, , abductors\par Sensation: Sensation present to light touch bilateral C5-T1 distributions\par Reflexes: + Harris's bilaterally \par \par LSPINE\par Inspection: No rash or ecchymosis\par Palpation: + b/l SIJ TTP\par ROM: limited all planes\par Strength: 5/5 bilateral hip flexors, knee extensors, ankle dorsiflexors, EHL, ankle plantarflexors\par Sensation: Sensation present to light touch bilateral L2-S1 distributions\par Provocative maneuvers: Negative bilateral straight leg raise

## 2023-05-24 ENCOUNTER — APPOINTMENT (OUTPATIENT)
Dept: ORTHOPEDIC SURGERY | Facility: CLINIC | Age: 73
End: 2023-05-24

## 2023-07-18 ENCOUNTER — APPOINTMENT (OUTPATIENT)
Dept: ORTHOPEDIC SURGERY | Facility: CLINIC | Age: 73
End: 2023-07-18
Payer: MEDICARE

## 2023-07-18 DIAGNOSIS — M46.1 SACROILIITIS, NOT ELSEWHERE CLASSIFIED: ICD-10-CM

## 2023-07-18 PROCEDURE — 20552 NJX 1/MLT TRIGGER POINT 1/2: CPT

## 2023-07-18 PROCEDURE — 99214 OFFICE O/P EST MOD 30 MIN: CPT | Mod: 25

## 2023-07-18 RX ORDER — DICLOFENAC SODIUM 1% 10 MG/G
1 GEL TOPICAL DAILY
Qty: 3 | Refills: 3 | Status: ACTIVE | COMMUNITY
Start: 2023-07-18 | End: 1900-01-01

## 2023-07-18 NOTE — HISTORY OF PRESENT ILLNESS
[Lower back] : lower back [Sudden] : sudden [4] : 4 [Dull/Aching] : dull/aching [Localized] : localized [Constant] : constant [Rest] : rest [Massage] : massage [Physical therapy] : physical therapy [de-identified] : 5/12/20 Cervical MRI  - report noted in chart. \par Findings: There is straightening of the cervical lordosis, convexity of the lower cervical spine towards the right and upper\par thoracic spine towards the left. The visualized posterior fossa structures appear unremarkable. The cervical spinal cord\par appears intact. There is multilevel disc desiccation with loss of disc height and degenerative endplate changes along with\par anterolisthesis at C4-C5. There are degenerative changes in the visualized upper thoracic spine. There are no gross\par paravertebral soft tissue masses.\par C2-C3: There is a left paracentral protrusion, mild disc bulging and bilateral uncovertebral joint hypertrophy.\par C3-C4: There is mild diffuse disc bulging, bony ridging, uncovertebral joint hypertrophy, and left greater than right\par foraminal narrowing.\par C4-C5: There is anterolisthesis, disc bulging, bony ridging, and uncovertebral joint hypertrophy with broad-based\par posterior disc herniation encroaching upon the cord and right greater than left foraminal narrowing with encroachment\par upon right greater than left exiting nerve roots.\par C5-C6: There is diffuse disc bulging and bony ridging with moderate bilateral foraminal narrowing and uncovertebral\par joint hypertrophy left greater than right.\par C6-C7: There is diffuse disc bulging, bony ridging and uncovertebral joint hypertrophy with moderate bilateral foraminal\par narrowing left greater than right.\par Ind. review- Near ankylosis across disc spaces C3/4, C5/6;\par Gr 1 C4/5 with b/l NF narrowing;\par \par 5/14/20 [place]- report noted in chart. \par Gr 1 L4/5 with R NF HNP and L4, L5 compression\par -----------------------------------------\par 3/1/23- Burning pain across the beltline. No clear pain, N/T down the BLE. No bb dysfunction. Has been in formal PT. Also neck pain radiating across the scapulae. \par 5/3/23- Sxs same, still across the beltline, in PT\par 7/18/23- R sided LBP.  [] : no

## 2023-07-18 NOTE — IMAGING
[Facet arthropathy] : Facet arthropathy [Disc space narrowing] : Disc space narrowing [Scoliosis] : Scoliosis [Spondylolithesis] : Spondylolithesis [de-identified] : CSPINE\par Inspection: No rash or ecchymosis\par Palpation: No spasm in traps, rhomboids, paracervicals\par ROM: limited all planes\par Strength: 5/5 bilateral deltoid, biceps, triceps, wrist flexors, wrist extensors, , abductors\par Sensation: Sensation present to light touch bilateral C5-T1 distributions\par Reflexes: + Harris's bilaterally \par \par LSPINE\par Inspection: No rash or ecchymosis\par Palpation: R SIJ TTP\par ROM: limited all planes\par Strength: 5/5 bilateral hip flexors, knee extensors, ankle dorsiflexors, EHL, ankle plantarflexors\par Sensation: Sensation present to light touch bilateral L2-S1 distributions\par Provocative maneuvers: Negative bilateral straight leg raise

## 2023-07-18 NOTE — ASSESSMENT
[FreeTextEntry1] : Mostly R SIJ pain\par Discussed TPIs\par \par Trigger Point Injection- The risks, benefits, contents and alternatives to injection were explained in full to the patient.  Risks outlined include but are not limited to infection, bleeding, scarring, skin discoloration, temporary increase in pain, syncopal episode, failure to resolve symptoms, allergic reaction, flare reaction, permanent white skin discoloration, symptom recurrence, and elevation of blood sugar in diabetics.  Patient understood the risks.  All questions were answered.  After discussion of options, patient requested an injection.  Oral informed consent was obtained and sterile prep was done of the injection site.  Sterile technique was used to introduce the mixture. The mixture consisted of: \par 4 cc 1% lidocaine\par 80 mg of depomedrol \par Patient tolerated the procedure well.  Patient advised to ice the injection site this evening.  Signs and symptoms of infection reviewed and patient advised to call immediately for redness, fevers, and/or chills. \par R SIJ/ paraspinal

## 2023-08-18 ENCOUNTER — OUTPATIENT (OUTPATIENT)
Dept: OUTPATIENT SERVICES | Facility: HOSPITAL | Age: 73
LOS: 1 days | End: 2023-08-18
Payer: MEDICARE

## 2023-08-18 ENCOUNTER — RESULT REVIEW (OUTPATIENT)
Age: 73
End: 2023-08-18

## 2023-08-18 ENCOUNTER — APPOINTMENT (OUTPATIENT)
Dept: MAMMOGRAPHY | Facility: IMAGING CENTER | Age: 73
End: 2023-08-18
Payer: MEDICARE

## 2023-08-18 ENCOUNTER — APPOINTMENT (OUTPATIENT)
Dept: ULTRASOUND IMAGING | Facility: IMAGING CENTER | Age: 73
End: 2023-08-18
Payer: MEDICARE

## 2023-08-18 DIAGNOSIS — Z00.8 ENCOUNTER FOR OTHER GENERAL EXAMINATION: ICD-10-CM

## 2023-08-18 PROCEDURE — 77063 BREAST TOMOSYNTHESIS BI: CPT | Mod: 26

## 2023-08-18 PROCEDURE — 77067 SCR MAMMO BI INCL CAD: CPT | Mod: 26

## 2023-08-18 PROCEDURE — 76641 ULTRASOUND BREAST COMPLETE: CPT | Mod: 26,50,3G

## 2023-08-18 PROCEDURE — 76641 ULTRASOUND BREAST COMPLETE: CPT

## 2023-08-18 PROCEDURE — 77063 BREAST TOMOSYNTHESIS BI: CPT

## 2023-08-18 PROCEDURE — 77067 SCR MAMMO BI INCL CAD: CPT

## 2023-09-07 NOTE — ED ADULT TRIAGE NOTE - CCC[facility] TRG CHIEF CMPLNT
Problem: Discharge Planning  Goal: Discharge to home or other facility with appropriate resources  9/7/2023 0014 by Maira Zavala RN  Outcome: Progressing     Problem: Pain  Goal: Verbalizes/displays adequate comfort level or baseline comfort level  9/7/2023 0014 by Maira Zavala RN  Outcome: Progressing   Pt denies pain at this time. Problem: Safety - Adult  Goal: Free from fall injury  9/7/2023 0014 by Maira Zavala RN  Outcome: Progressing   Fall precautions in place. Bed alarm on. Bed locked in low position. fall, syncope

## 2024-05-13 NOTE — ED ADULT NURSE NOTE - CAS EDN DISCHARGE ASSESSMENT
Kimberly Palomares is a 79 year old female presenting for a follow-up of LBP SS/NC.   Rates pain 0/10 fluctuates worse with getting up from chair, bed etc.  Taking Gabapentin for pain.    Procedure Follow Up: No    Medications, allergies and tobacco use reviewed.  Denies known Latex allergy or symptoms of Latex sensitivity.    REVIEW OF SYSTEMS:  CONSTITUTIONAL: no fever, weight changes, fatigue or drowsiness   HEENT: no dysphagia or vision changes  CARDIOVASCULAR: no chest pain, palpitations or syncope   RESPIRATORY: no cough, shortness of breath or wheezing   GI: no constipation, diarrhea, nausea, or vomiting   : no incontinence, urgency, or hesitancy  MUSCULOSKELETAL: as above  INTEGUMENTARY: no hypersensitivity, discoloration, or rash   NEURO: as above  ENDOCRINE: no temperature intolerance, polyuria, or polydipsia   HEME/LYMPH: no easy bruising, bleeding tendencies, lymphadenopathy   PSYCHIATRIC: no anxiety, depression or insomnia    Are you taking medication as instructed? Yes  Do you have any medication questions/concerns since your last visit?  No  Did the patient bring a friend or family member with them into the room? No  If so, did the patient give explicit permission for the practitioner to discuss their healthcare in front of that friend/family member? N/A      
Alert and oriented to person, place and time

## 2024-09-12 ENCOUNTER — APPOINTMENT (OUTPATIENT)
Dept: SURGICAL ONCOLOGY | Facility: CLINIC | Age: 74
End: 2024-09-12

## 2024-11-25 ENCOUNTER — RESULT REVIEW (OUTPATIENT)
Age: 74
End: 2024-11-25

## 2024-11-25 ENCOUNTER — APPOINTMENT (OUTPATIENT)
Dept: MAMMOGRAPHY | Facility: IMAGING CENTER | Age: 74
End: 2024-11-25
Payer: MEDICARE

## 2024-11-25 ENCOUNTER — APPOINTMENT (OUTPATIENT)
Dept: ULTRASOUND IMAGING | Facility: IMAGING CENTER | Age: 74
End: 2024-11-25
Payer: MEDICARE

## 2024-11-25 ENCOUNTER — OUTPATIENT (OUTPATIENT)
Dept: OUTPATIENT SERVICES | Facility: HOSPITAL | Age: 74
LOS: 1 days | End: 2024-11-25
Payer: MEDICARE

## 2024-11-25 DIAGNOSIS — Z12.31 ENCOUNTER FOR SCREENING MAMMOGRAM FOR MALIGNANT NEOPLASM OF BREAST: ICD-10-CM

## 2024-11-25 PROCEDURE — 77067 SCR MAMMO BI INCL CAD: CPT | Mod: 26

## 2024-11-25 PROCEDURE — 77080 DXA BONE DENSITY AXIAL: CPT

## 2024-11-25 PROCEDURE — 76641 ULTRASOUND BREAST COMPLETE: CPT | Mod: 26,50,GA

## 2024-11-25 PROCEDURE — 76641 ULTRASOUND BREAST COMPLETE: CPT

## 2024-11-25 PROCEDURE — 77063 BREAST TOMOSYNTHESIS BI: CPT | Mod: 26

## 2024-11-25 PROCEDURE — 77067 SCR MAMMO BI INCL CAD: CPT

## 2024-11-25 PROCEDURE — 77080 DXA BONE DENSITY AXIAL: CPT | Mod: 26

## 2024-11-25 PROCEDURE — 77063 BREAST TOMOSYNTHESIS BI: CPT

## 2025-04-16 ENCOUNTER — DOCTOR'S OFFICE (OUTPATIENT)
Facility: LOCATION | Age: 75
Setting detail: OPHTHALMOLOGY
End: 2025-04-16
Payer: MEDICARE

## 2025-04-16 DIAGNOSIS — H25.13: ICD-10-CM

## 2025-04-16 DIAGNOSIS — H43.811: ICD-10-CM

## 2025-04-16 DIAGNOSIS — H40.033: ICD-10-CM

## 2025-04-16 DIAGNOSIS — H52.03: ICD-10-CM

## 2025-04-16 DIAGNOSIS — H16.223: ICD-10-CM

## 2025-04-16 DIAGNOSIS — D31.40: ICD-10-CM

## 2025-04-16 PROCEDURE — 92004 COMPRE OPH EXAM NEW PT 1/>: CPT | Performed by: OPHTHALMOLOGY

## 2025-04-16 ASSESSMENT — SUPERFICIAL PUNCTATE KERATITIS (SPK)
OS_SPK: 1+
OD_SPK: 1+ 2+

## 2025-04-16 ASSESSMENT — REFRACTION_CURRENTRX
OD_SPHERE: +7.00
OS_OVR_VA: 20/
OD_AXIS: 067
OS_CYLINDER: +1.75
OD_OVR_VA: 20/
OD_CYLINDER: +1.00
OS_AXIS: 124
OS_SPHERE: +5.75

## 2025-04-16 ASSESSMENT — KERATOMETRY
OD_K1POWER_DIOPTERS: 41.25
OD_AXISANGLE_DEGREES: 079
OS_AXISANGLE_DEGREES: 123
OS_K2POWER_DIOPTERS: 43.00
OD_K2POWER_DIOPTERS: 43.25
OS_K1POWER_DIOPTERS: 40.75

## 2025-04-16 ASSESSMENT — REFRACTION_AUTOREFRACTION
OS_CYLINDER: +2.50
OD_AXIS: 078
OD_SPHERE: +7.50
OD_CYLINDER: +1.75
OS_AXIS: 123
OS_SPHERE: +6.50

## 2025-04-16 ASSESSMENT — CONFRONTATIONAL VISUAL FIELD TEST (CVF)
OD_FINDINGS: FULL
OS_FINDINGS: FULL

## 2025-04-16 ASSESSMENT — VISUAL ACUITY
OS_BCVA: 20/
OD_BCVA: 20/

## 2025-04-16 ASSESSMENT — TONOMETRY: OS_IOP_MMHG: 16

## 2025-04-23 ENCOUNTER — DOCTOR'S OFFICE (OUTPATIENT)
Facility: LOCATION | Age: 75
Setting detail: OPHTHALMOLOGY
End: 2025-04-23
Payer: MEDICARE

## 2025-04-23 DIAGNOSIS — H25.13: ICD-10-CM

## 2025-04-23 DIAGNOSIS — H40.033: ICD-10-CM

## 2025-04-23 DIAGNOSIS — H16.223: ICD-10-CM

## 2025-04-23 DIAGNOSIS — H43.811: ICD-10-CM

## 2025-04-23 DIAGNOSIS — H52.03: ICD-10-CM

## 2025-04-23 DIAGNOSIS — D31.40: ICD-10-CM

## 2025-04-23 PROCEDURE — 92014 COMPRE OPH EXAM EST PT 1/>: CPT | Performed by: OPHTHALMOLOGY

## 2025-04-23 ASSESSMENT — CONFRONTATIONAL VISUAL FIELD TEST (CVF)
OS_FINDINGS: FULL
OD_FINDINGS: FULL

## 2025-04-23 ASSESSMENT — KERATOMETRY
OS_K2POWER_DIOPTERS: 43.50
OS_K1POWER_DIOPTERS: 41.50
OD_AXISANGLE_DEGREES: 081
OD_K1POWER_DIOPTERS: 41.50
OD_K2POWER_DIOPTERS: 45.00
OS_AXISANGLE_DEGREES: 123

## 2025-04-23 ASSESSMENT — REFRACTION_CURRENTRX
OS_SPHERE: +5.75
OS_AXIS: 124
OD_SPHERE: +7.00
OD_AXIS: 067
OD_OVR_VA: 20/
OS_OVR_VA: 20/
OD_CYLINDER: +1.00
OS_CYLINDER: +1.75

## 2025-04-23 ASSESSMENT — REFRACTION_AUTOREFRACTION
OD_CYLINDER: +2.00
OS_CYLINDER: +2.50
OD_AXIS: 075
OS_SPHERE: +6.25
OD_SPHERE: +7.25
OS_AXIS: 123

## 2025-04-23 ASSESSMENT — VISUAL ACUITY
OD_BCVA: 20/40-2+2
OS_BCVA: 20/60-1

## 2025-04-23 ASSESSMENT — SUPERFICIAL PUNCTATE KERATITIS (SPK)
OS_SPK: 1+
OD_SPK: 1+ 2+

## 2025-05-01 ENCOUNTER — RX ONLY (RX ONLY)
Age: 75
End: 2025-05-01

## 2025-05-01 ENCOUNTER — DOCTOR'S OFFICE (OUTPATIENT)
Facility: LOCATION | Age: 75
Setting detail: OPHTHALMOLOGY
End: 2025-05-01
Payer: MEDICARE

## 2025-05-01 DIAGNOSIS — H35.361: ICD-10-CM

## 2025-05-01 DIAGNOSIS — H43.811: ICD-10-CM

## 2025-05-01 DIAGNOSIS — H43.393: ICD-10-CM

## 2025-05-01 PROBLEM — H40.033 NARROW ANGLE; BOTH EYES: Status: ACTIVE | Noted: 2025-04-16

## 2025-05-01 PROBLEM — D31.40 IRIS NEVUS ; BOTH EYES: Status: ACTIVE | Noted: 2025-04-16

## 2025-05-01 PROBLEM — H25.13 CATARACT SENILE NUCLEAR SCLEROSIS; BOTH EYES: Status: ACTIVE | Noted: 2025-04-16

## 2025-05-01 PROBLEM — H16.223 DRY EYE SYNDROME K SICCA; BOTH EYES: Status: ACTIVE | Noted: 2025-04-16

## 2025-05-01 PROBLEM — H52.03 HYPEROPIA; BOTH EYES: Status: ACTIVE | Noted: 2025-04-16

## 2025-05-01 PROCEDURE — 99213 OFFICE O/P EST LOW 20 MIN: CPT | Performed by: OPHTHALMOLOGY

## 2025-05-01 PROCEDURE — 92134 CPTRZ OPH DX IMG PST SGM RTA: CPT | Performed by: OPHTHALMOLOGY

## 2025-05-01 PROCEDURE — 92201 OPSCPY EXTND RTA DRAW UNI/BI: CPT | Performed by: OPHTHALMOLOGY

## 2025-05-01 ASSESSMENT — KERATOMETRY
OS_AXISANGLE_DEGREES: 123
OD_K1POWER_DIOPTERS: 41.50
OD_K2POWER_DIOPTERS: 45.00
OS_K2POWER_DIOPTERS: 43.50
OS_K1POWER_DIOPTERS: 41.50
OD_AXISANGLE_DEGREES: 081

## 2025-05-01 ASSESSMENT — REFRACTION_AUTOREFRACTION
OD_AXIS: 075
OD_SPHERE: +7.25
OD_CYLINDER: +2.00
OS_SPHERE: +6.25
OS_AXIS: 123
OS_CYLINDER: +2.50

## 2025-05-01 ASSESSMENT — VISUAL ACUITY
OS_BCVA: 20/60
OD_BCVA: 20/40+1

## 2025-05-01 ASSESSMENT — SUPERFICIAL PUNCTATE KERATITIS (SPK)
OS_SPK: 1+
OD_SPK: 1+ 2+

## 2025-05-22 ENCOUNTER — DOCTOR'S OFFICE (OUTPATIENT)
Facility: LOCATION | Age: 75
Setting detail: OPHTHALMOLOGY
End: 2025-05-22
Payer: MEDICARE

## 2025-05-22 DIAGNOSIS — H43.393: ICD-10-CM

## 2025-05-22 DIAGNOSIS — H35.361: ICD-10-CM

## 2025-05-22 DIAGNOSIS — H43.811: ICD-10-CM

## 2025-05-22 PROCEDURE — 99213 OFFICE O/P EST LOW 20 MIN: CPT | Performed by: OPHTHALMOLOGY

## 2025-05-22 PROCEDURE — 92134 CPTRZ OPH DX IMG PST SGM RTA: CPT | Performed by: OPHTHALMOLOGY

## 2025-05-22 ASSESSMENT — KERATOMETRY
OD_K2POWER_DIOPTERS: 45.00
OS_K2POWER_DIOPTERS: 43.50
OD_K1POWER_DIOPTERS: 41.50
OS_AXISANGLE_DEGREES: 123
OD_AXISANGLE_DEGREES: 081
OS_K1POWER_DIOPTERS: 41.50

## 2025-05-22 ASSESSMENT — VISUAL ACUITY
OD_BCVA: 20/40-2+1
OS_BCVA: 20/60-2

## 2025-05-22 ASSESSMENT — REFRACTION_AUTOREFRACTION
OS_CYLINDER: +2.50
OS_SPHERE: +6.25
OD_AXIS: 075
OS_AXIS: 123
OD_SPHERE: +7.25
OD_CYLINDER: +2.00

## 2025-05-22 ASSESSMENT — SUPERFICIAL PUNCTATE KERATITIS (SPK)
OD_SPK: 1+ 2+
OS_SPK: 1+

## 2025-05-29 NOTE — ED ADULT NURSE REASSESSMENT NOTE - NS ED NURSE REASSESS COMMENT FT1
Delay in triage due to patient being in bathroom REQUIRED- Click to run Sepsis Recognition Calculator

## 2025-06-19 ENCOUNTER — DOCTOR'S OFFICE (OUTPATIENT)
Facility: LOCATION | Age: 75
Setting detail: OPHTHALMOLOGY
End: 2025-06-19
Payer: MEDICARE

## 2025-06-19 DIAGNOSIS — H35.361: ICD-10-CM

## 2025-06-19 DIAGNOSIS — H43.393: ICD-10-CM

## 2025-06-19 DIAGNOSIS — H43.811: ICD-10-CM

## 2025-06-19 PROCEDURE — 99213 OFFICE O/P EST LOW 20 MIN: CPT | Performed by: OPHTHALMOLOGY

## 2025-06-19 PROCEDURE — 92134 CPTRZ OPH DX IMG PST SGM RTA: CPT | Performed by: OPHTHALMOLOGY

## 2025-06-19 ASSESSMENT — KERATOMETRY
OD_K1POWER_DIOPTERS: 41.50
OD_K2POWER_DIOPTERS: 45.00
OS_K1POWER_DIOPTERS: 41.50
OS_AXISANGLE_DEGREES: 123
OD_AXISANGLE_DEGREES: 081
OS_K2POWER_DIOPTERS: 43.50

## 2025-06-19 ASSESSMENT — VISUAL ACUITY
OS_BCVA: 20/50-
OD_BCVA: 20/50+2

## 2025-06-19 ASSESSMENT — REFRACTION_AUTOREFRACTION
OD_AXIS: 075
OD_CYLINDER: +2.00
OD_SPHERE: +7.25
OS_AXIS: 123
OS_SPHERE: +6.25
OS_CYLINDER: +2.50

## 2025-06-19 ASSESSMENT — SUPERFICIAL PUNCTATE KERATITIS (SPK)
OD_SPK: 1+ 2+
OS_SPK: 1+

## 2025-07-01 ENCOUNTER — APPOINTMENT (OUTPATIENT)
Dept: ORTHOPEDIC SURGERY | Facility: CLINIC | Age: 75
End: 2025-07-01
Payer: MEDICARE

## 2025-07-01 VITALS — HEIGHT: 59 IN | WEIGHT: 125 LBS | BODY MASS INDEX: 25.2 KG/M2

## 2025-07-01 PROCEDURE — 73564 X-RAY EXAM KNEE 4 OR MORE: CPT | Mod: RT

## 2025-07-01 PROCEDURE — 99203 OFFICE O/P NEW LOW 30 MIN: CPT
